# Patient Record
Sex: MALE | Race: WHITE | NOT HISPANIC OR LATINO | URBAN - METROPOLITAN AREA
[De-identification: names, ages, dates, MRNs, and addresses within clinical notes are randomized per-mention and may not be internally consistent; named-entity substitution may affect disease eponyms.]

---

## 2022-05-24 ENCOUNTER — NEW PATIENT (OUTPATIENT)
Dept: URBAN - METROPOLITAN AREA CLINIC 50 | Facility: CLINIC | Age: 42
End: 2022-05-24

## 2022-05-24 DIAGNOSIS — D44.3: ICD-10-CM

## 2022-05-24 DIAGNOSIS — H49.21: ICD-10-CM

## 2022-05-24 PROCEDURE — 92004 COMPRE OPH EXAM NEW PT 1/>: CPT

## 2022-05-24 ASSESSMENT — VISUAL ACUITY
OS_SC: 20/30+2
OS_SC: J1+
OU_SC: 20/20-1
OU_SC: J1+
OD_SC: J1+
OS_PH: 20/20-2
OD_SC: 20/20-1

## 2022-05-24 ASSESSMENT — TONOMETRY
OS_IOP_MMHG: 16
OD_IOP_MMHG: 16

## 2022-05-24 NOTE — PATIENT DISCUSSION
2012 had first procedure to treat pituitary tumor. 2017 had gamma knife procedure. Has not had any follow ups in 3 years.

## 2022-06-03 ENCOUNTER — DIAGNOSTICS ONLY (OUTPATIENT)
Dept: URBAN - METROPOLITAN AREA CLINIC 50 | Facility: CLINIC | Age: 42
End: 2022-06-03

## 2022-06-03 DIAGNOSIS — H49.21: ICD-10-CM

## 2022-06-03 PROCEDURE — 92083 EXTENDED VISUAL FIELD XM: CPT

## 2022-06-06 ENCOUNTER — CONTACT LENSES/GLASSES VISIT (OUTPATIENT)
Dept: URBAN - METROPOLITAN AREA CLINIC 50 | Facility: CLINIC | Age: 42
End: 2022-06-06

## 2022-06-06 DIAGNOSIS — H49.21: ICD-10-CM

## 2022-06-06 PROCEDURE — 92060 SENSORIMOTOR EXAMINATION: CPT

## 2022-06-06 ASSESSMENT — VISUAL ACUITY
OU_SC: J1+
OU_SC: 20/20
OD_SC: 20/20
OS_SC: 20/20

## 2022-06-06 NOTE — PATIENT DISCUSSION
Patient had MRI done and per patient MRI confirmed it was not the tumor coming back that was causing double vision. Patient did state that he has recurrent right ear infections and was wondering if this could be the cause of his double vision.

## 2022-06-06 NOTE — PATIENT DISCUSSION
Per patient request would like referral to binocular vision specialist. Will send referral to Dr. Hi Joaquin.

## 2022-06-06 NOTE — PATIENT DISCUSSION
Advised patient if we are unsuccessful today we can refer out to a binocular vision specialist. There is also the idea of buying a pair of fashion glasses and adding the [employer] sticker and see if that helps.

## 2022-06-06 NOTE — PATIENT DISCUSSION
Began with 4 BD OS and patient walked down to 3 BD OS vertically. Can not fix the horizontal double vision.  Currently showing 20 ODIN OD.

## 2022-06-06 NOTE — PATIENT DISCUSSION
Patient confirms no double vision with prism bars when shown the 25BO. Patient did not need the 3BD OS. Patient is going to purchase a pair of fashion glasses and come back for the [employer] sticker.

## 2022-06-28 ENCOUNTER — FOLLOW UP (OUTPATIENT)
Dept: URBAN - METROPOLITAN AREA CLINIC 50 | Facility: CLINIC | Age: 42
End: 2022-06-28

## 2022-06-28 DIAGNOSIS — D44.3: ICD-10-CM

## 2022-06-28 DIAGNOSIS — H49.21: ICD-10-CM

## 2022-06-28 PROCEDURE — 92012 INTRM OPH EXAM EST PATIENT: CPT

## 2022-06-28 ASSESSMENT — VISUAL ACUITY
OD_SC: 20/25
OS_SC: 20/25

## 2022-06-28 NOTE — PATIENT DISCUSSION
LW recommended referral to Dr. Hi Joaquin. Patient states he never received a call to schedule. If diplopia is still persistent after 6 months, will refer again.

## 2022-06-28 NOTE — PATIENT DISCUSSION
Patient also seeing Dr. Mitch Cormier. Had blood work done to rule out Myasthenia Gravis, testing was negative.

## 2022-08-30 ENCOUNTER — FOLLOW UP (OUTPATIENT)
Dept: URBAN - METROPOLITAN AREA CLINIC 50 | Facility: CLINIC | Age: 42
End: 2022-08-30

## 2022-08-30 DIAGNOSIS — H49.21: ICD-10-CM

## 2022-08-30 PROCEDURE — 92012 INTRM OPH EXAM EST PATIENT: CPT

## 2022-08-30 ASSESSMENT — TONOMETRY
OD_IOP_MMHG: 16
OS_IOP_MMHG: 16

## 2022-08-30 ASSESSMENT — VISUAL ACUITY
OS_SC: 20/25
OD_SC: 20/30+2

## 2022-08-30 NOTE — PATIENT DISCUSSION
Patient also seeing Dr. Quentin Mayorga. Had blood work done to rule out Myasthenia Gravis, testing was negative.

## 2022-08-30 NOTE — PATIENT DISCUSSION
DAVID recommended referral to Dr. Nazario Eugene. Patient states he never received a call to schedule. If diplopia is still persistent after 6 months, will refer again.

## 2025-02-11 ENCOUNTER — COMPREHENSIVE EXAM (OUTPATIENT)
Age: 45
End: 2025-02-11

## 2025-02-11 DIAGNOSIS — D44.3: ICD-10-CM

## 2025-02-11 DIAGNOSIS — H52.13: ICD-10-CM

## 2025-02-11 DIAGNOSIS — H49.21: ICD-10-CM

## 2025-02-11 DIAGNOSIS — H53.2: ICD-10-CM

## 2025-02-11 PROCEDURE — 92015 DETERMINE REFRACTIVE STATE: CPT

## 2025-02-11 PROCEDURE — 99214 OFFICE O/P EST MOD 30 MIN: CPT
